# Patient Record
Sex: FEMALE | Race: WHITE | NOT HISPANIC OR LATINO | Employment: STUDENT | URBAN - METROPOLITAN AREA
[De-identification: names, ages, dates, MRNs, and addresses within clinical notes are randomized per-mention and may not be internally consistent; named-entity substitution may affect disease eponyms.]

---
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
## 2024-11-08 ENCOUNTER — TELEPHONE (OUTPATIENT)
Dept: FAMILY MEDICINE | Facility: CLINIC | Age: 19
End: 2024-11-08

## 2024-11-27 ENCOUNTER — OFFICE VISIT (OUTPATIENT)
Dept: FAMILY MEDICINE | Facility: CLINIC | Age: 19
End: 2024-11-27
Payer: COMMERCIAL

## 2024-11-27 ENCOUNTER — HOSPITAL ENCOUNTER (OUTPATIENT)
Dept: RADIOLOGY | Facility: HOSPITAL | Age: 19
Discharge: HOME | End: 2024-11-27
Attending: OTOLARYNGOLOGY
Payer: COMMERCIAL

## 2024-11-27 ENCOUNTER — DOCUMENTATION ONLY (OUTPATIENT)
Dept: FAMILY MEDICINE | Facility: CLINIC | Age: 19
End: 2024-11-27

## 2024-11-27 VITALS
HEART RATE: 70 BPM | TEMPERATURE: 98 F | BODY MASS INDEX: 21.25 KG/M2 | HEIGHT: 64 IN | WEIGHT: 124.5 LBS | OXYGEN SATURATION: 99 % | SYSTOLIC BLOOD PRESSURE: 106 MMHG | DIASTOLIC BLOOD PRESSURE: 64 MMHG | RESPIRATION RATE: 18 BRPM

## 2024-11-27 DIAGNOSIS — Z01.818 PREOP EXAMINATION: ICD-10-CM

## 2024-11-27 DIAGNOSIS — Z01.818 OTHER SPECIFIED PRE-OPERATIVE EXAMINATION: ICD-10-CM

## 2024-11-27 DIAGNOSIS — J34.89 OVERDEVELOPMENT OF NASAL BONES: ICD-10-CM

## 2024-11-27 DIAGNOSIS — Z00.00 ENCOUNTER FOR WELLNESS EXAMINATION: Primary | ICD-10-CM

## 2024-11-27 DIAGNOSIS — Z13.220 ENCOUNTER FOR LIPID SCREENING FOR CARDIOVASCULAR DISEASE: ICD-10-CM

## 2024-11-27 DIAGNOSIS — Z13.6 ENCOUNTER FOR LIPID SCREENING FOR CARDIOVASCULAR DISEASE: ICD-10-CM

## 2024-11-27 DIAGNOSIS — Z79.01 LONG TERM (CURRENT) USE OF ANTICOAGULANTS: ICD-10-CM

## 2024-11-27 DIAGNOSIS — J34.2 DEVIATED NASAL SEPTUM: ICD-10-CM

## 2024-11-27 DIAGNOSIS — F90.0 ATTENTION DEFICIT HYPERACTIVITY DISORDER (ADHD), PREDOMINANTLY INATTENTIVE TYPE: ICD-10-CM

## 2024-11-27 DIAGNOSIS — J34.89 OVERDEVELOPMENT OF NASAL BONES: Primary | ICD-10-CM

## 2024-11-27 LAB
CHOLEST SERPL-MSCNC: 149 MG/DL (ref 0–200)
HDLC SERPL-MCNC: 115 MG/DL (ref 35–70)
LDLC SERPL CALC-MCNC: 23 MG/DL (ref 0–160)
TRIGL SERPL-MCNC: 79 MG/DL (ref 40–160)

## 2024-11-27 PROCEDURE — 71046 X-RAY EXAM CHEST 2 VIEWS: CPT | Mod: TC

## 2024-11-27 RX ORDER — METHYLPHENIDATE HYDROCHLORIDE 54 MG/1
TABLET ORAL
COMMUNITY
Start: 2024-09-10

## 2024-11-27 RX ORDER — ZALEPLON 5 MG/1
1 CAPSULE ORAL NIGHTLY
COMMUNITY

## 2024-11-27 RX ORDER — ESCITALOPRAM OXALATE 10 MG/1
1 TABLET ORAL DAILY
COMMUNITY
Start: 2024-01-11

## 2024-11-27 NOTE — PROGRESS NOTES
Patient ID: [id number]     Chief Complaint: Establish Care (Establishing Care/Previous PCP- Pedi)        HPI:     Steph Parada is a 19 y.o. female here today for an annual wellness visit. Overall she feels well. No other complaints today. Denies depression, si/hi, melena, hematochezia. She does have menorrhagia with irregular cycles.     She is here for a preop exam as well. Will be having a septoplasty with Dr. Moody Edwards. Has no PMH outside of anxiety and adhd. Sees psych for this.     Preop exam  Active CP-NO  Heart Failure-NO  Valvular Disease-NO  History of CVA-NO  Serum Creatinine >(2.0)-NO    ?Can take care of self, such as eat, dress, or use the toilet (1 MET)-Yes  ?Can walk up a flight of steps or a hill or walk on level ground at 3 to 4 mph (4 METs)-Yes          No past medical history on file.     Past Surgical History:   Procedure Laterality Date    RHINOPLASTY      TONSILLECTOMY      WISDOM TOOTH EXTRACTION         Review of patient's allergies indicates:  No Known Allergies    Outpatient Medications Marked as Taking for the 11/27/24 encounter (Office Visit) with Emily Rascon MD   Medication Sig Dispense Refill    EScitalopram oxalate (LEXAPRO) 10 MG tablet Take 1 tablet by mouth once daily.      methylphenidate HCl (CONCERTA) 54 MG CR tablet       zaleplon (SONATA) 5 MG Cap Take 1 capsule by mouth every evening.         Social History     Socioeconomic History    Marital status: Single   Tobacco Use    Smoking status: Never    Smokeless tobacco: Never   Substance and Sexual Activity    Alcohol use: Yes     Alcohol/week: 2.0 standard drinks of alcohol     Types: 2 Glasses of wine per week    Drug use: Never    Sexual activity: Not Currently     Birth control/protection: None     Social Drivers of Health     Financial Resource Strain: Low Risk  (11/27/2024)    Overall Financial Resource Strain (CARDIA)     Difficulty of Paying Living Expenses: Not hard at all   Food Insecurity: No  Food Insecurity (11/27/2024)    Hunger Vital Sign     Worried About Running Out of Food in the Last Year: Never true     Ran Out of Food in the Last Year: Never true   Physical Activity: Sufficiently Active (11/27/2024)    Exercise Vital Sign     Days of Exercise per Week: 7 days     Minutes of Exercise per Session: 90 min   Stress: Stress Concern Present (11/27/2024)    [employer] - Occupational Stress Questionnaire     Feeling of Stress : Very much   Housing Stability: Unknown (11/27/2024)    Housing Stability Vital Sign     Unable to Pay for Housing in the Last Year: No        Family History   Problem Relation Name Age of Onset    Arthritis Mother Why     Alcohol abuse Paternal Aunt Why         Patient Care Team:  Emily Rascon MD as PCP - General (Family Medicine)       Subjective:     ROS    Constitutional: Denies Change in appetite. Denies Chills. Denies Fever. Denies Night sweats.  Eye: Denies changes in vision  ENT: Denies Decreased hearing. Denies Sore throat. Denies Swollen glands.  Respiratory: Denies Cough. Denies Shortness of breath. Denies Shortness of breath with exertion. Denies Wheezing.  Cardiovascular: DeniesChest pain at rest. Denies Chest pain with exertion. Denies Irregular heartbeat. Denies Palpitations. Denies Edema.  Gastrointestinal: Denies Abdominal pain. DeniesDiarrhea. Denies Nausea. Denies Vomiting. Denies Hematemesis or Hematochezia.  Genitourinary: Denies Dysuria. Denies Urinary frequency. Denies Urinary urgency. Denies Blood in urine.  Endocrine: Denies Cold intolerance. Denies Excessive thirst. Denies Heat intolerance. Denies Weight loss. Denies Weight gain.  Musculoskeletal: Denies Painful joints. Denies Weakness.  Integumentary: Denies Rash. Denies Itching. Denies Dry skin.  Neurologic: Denies Dizziness. Denies Fainting. Denies Headache.  Psychiatric: Denies Depression. Denies Anxiety. Denies Suicidal/Homicidal ideations.    All Other ROS:  "Negative except as stated in HPI.       Objective:     /64 (BP Location: Right arm, Patient Position: Sitting)   Pulse 70   Temp 98.1 °F (36.7 °C) (Temporal)   Resp 18   Ht 5' 3.78" (1.62 m)   Wt 56.5 kg (124 lb 8 oz)   LMP 11/22/2024   SpO2 99%   BMI 21.52 kg/m²     Physical Exam    General: Alert and oriented, No acute distress.  Head: Normocephalic, Atraumatic.  Eye: Pupils are equal, round and reactive to light, Extraocular movements are intact, Sclera non-icteric.  Ears/Nose/Throat: TM+ light reflexes bilaterally. Normal, Mucosa moist,Clear. Teeth intact, no lesions of tongue, palate, mucosa.  Respiratory: Clear to auscultation bilaterally; No wheezes, rales or rhonchi, Non-labored respirations, Symmetrical chest wall expansion.  Cardiovascular: Regular rate and rhythm, S1/S2 normal, No murmurs, rubs or gallops.  Gastrointestinal: Soft, Non-tender, Non-distended, Normal bowel sounds, No palpable organomegaly.  Integumentary: Warm, Dry, Intact, No suspicious lesions or rashes.  Extremities: No clubbing, cyanosis or edema  Psychiatric: Normal interaction, Coherent speech, Euthymic mood, Appropriate affect       Assessment:     Problem List Items Addressed This Visit       Attention deficit hyperactivity disorder (ADHD), predominantly inattentive type    Overview     On concerta. Managed by psych in [place], [place]    Continue current Rx meds            Other Visit Diagnoses       Encounter for wellness examination    -  Primary    Relevant Orders    Comprehensive Metabolic Panel    TSH    T4, Free    Lipid Panel    Preop examination        Relevant Orders    Comprehensive Metabolic Panel    TSH    T4, Free    Encounter for lipid screening for cardiovascular disease        Relevant Orders    Lipid Panel            Plan:     Will check cbc, cmp, tsh, free t4 and lipids  Patient is low risk for low risk surgery    Health Maintenance Topics with due status: Not Due       Topic Last Completion Date    " TETANUS VACCINE 07/22/2015    RSV Vaccine (Age 60+ and Pregnant patients) Not Due        Eye Exam - Recommend annually.    Dental Exam - Recommend biannual exams.     Vaccinations -   There is no immunization history on file for this patient.   Patient was counseled on risks/benefits of receiving immunization. All questions were answered    Perform monthly self breast exams and call me immediately if you find a lump/bump or have any skin/nipple changes  Exercise for a total of 150 min per week and eat a healthy diet  Stay up to date with all cancer screening discussed in visit  Immunizations due were discussed during visit  All health maintenance was reviewed with patient. Patient verbalized understanding. All questions were answered.     Medication List with Changes/Refills   Current Medications    ESCITALOPRAM OXALATE (LEXAPRO) 10 MG TABLET    Take 1 tablet by mouth once daily.       Start Date: 1/11/2024 End Date: --    METHYLPHENIDATE HCL (CONCERTA) 54 MG CR TABLET           Start Date: 9/10/2024 End Date: --    ZALEPLON (SONATA) 5 MG CAP    Take 1 capsule by mouth every evening.       Start Date: --        End Date: --   Discontinued Medications    ONDANSETRON (ZOFRAN-ODT) 4 MG TBDL    Take 1 tablet (4 mg total) by mouth every 8 (eight) hours as needed (nausea).       Start Date: 11/14/2022End Date: 11/27/2024    PROMETHAZINE-DEXTROMETHORPHAN (PROMETHAZINE-DM) 6.25-15 MG/5 ML SYRP    Take 5 mLs by mouth every 6 (six) hours as needed. May cause drowsiness       Start Date: 11/14/2022End Date: 11/27/2024          The patient's Health Maintenance was reviewed and the following appears to be due at this time:   Health Maintenance Due   Topic Date Due    Hepatitis C Screening  Never done    Lipid Panel  Never done    HIV Screening  Never done    Influenza Vaccine (1) 09/01/2024    COVID-19 Vaccine (1 - 2024-25 season) Never done         No follow-ups on file. In addition to their scheduled follow up, the patient has  also been instructed to follow up on as needed basis.

## 2024-12-17 ENCOUNTER — ANESTHESIA EVENT (OUTPATIENT)
Facility: HOSPITAL | Age: 19
End: 2024-12-17
Payer: COMMERCIAL

## 2024-12-17 RX ORDER — LIDOCAINE HYDROCHLORIDE 10 MG/ML
0.5 INJECTION, SOLUTION EPIDURAL; INFILTRATION; INTRACAUDAL; PERINEURAL ONCE
Status: CANCELLED | OUTPATIENT
Start: 2024-12-17 | End: 2024-12-17

## 2024-12-17 RX ORDER — CELECOXIB 200 MG/1
400 CAPSULE ORAL ONCE
Status: CANCELLED | OUTPATIENT
Start: 2024-12-17 | End: 2024-12-17

## 2024-12-17 NOTE — ANESTHESIA PREPROCEDURE EVALUATION
"                                                                                                             12/17/2024  Steph Parada is a 19 y.o., female.    Pre-operative evaluation for Procedure(s) (LRB):  RHINOPLASTY    SEPTOPLASTY, NOSE (N/A)    Steph Parada is a 19 y.o. female     Patient Active Problem List   Diagnosis    Attention deficit hyperactivity disorder (ADHD), predominantly inattentive type       Review of patient's allergies indicates:  No Known Allergies    No current facility-administered medications on file prior to encounter.     No current outpatient medications on file prior to encounter.       Past Surgical History:   Procedure Laterality Date    ADENOIDECTOMY      RHINOPLASTY      TONSILLECTOMY      WISDOM TOOTH EXTRACTION       CBC: No results for input(s): "WBC", "RBC", "HGB", "HCT", "PLT", "MCV", "MCH", "MCHC" in the last 72 hours.    CMP: No results for input(s): "NA", "K", "CL", "CO2", "BUN", "CREATININE", "GLU", "MG", "PHOS", "CALCIUM", "ALBUMIN", "PROT", "ALKPHOS", "ALT", "AST", "BILITOT" in the last 72 hours.  Diagnostic Studies:  CXR 11/27/2024: [facility]    EKG :  2D Echo :  No results found for this or any previous visit.  Pre-op Assessment    I have reviewed the Patient Summary Reports.     I have reviewed the Nursing Notes. I have reviewed the NPO Status.   I have reviewed the Medications.     Review of Systems  Anesthesia Hx:  No problems with previous Anesthesia   History of prior surgery of interest to airway management or planning:  Previous anesthesia: General TA:; RHinoplasty with general anesthesia.       Airway issues documented on chart review include GETA     Denies Family Hx of Anesthesia complications.    Denies Personal Hx of Anesthesia complications.                    Social:  Social Alcohol Use 2 drinks/week      Hematology/Oncology:  Hematology Normal   Oncology Normal                                   EENT/Dental:  EENT/Dental Normal         "   Cardiovascular:  Cardiovascular Normal                                              Pulmonary:  Pulmonary Normal                       Renal/:  Renal/ Normal                 Hepatic/GI:  Hepatic/GI Normal                    Musculoskeletal:     Mild scoliosis       Spine Disorders:   Scoliosis          Neurological:  Neurology Normal                                      Endocrine:  Endocrine Normal            Dermatological:  Skin Normal    Psych:  Psychiatric History   ADHD               Physical Exam  General: Well nourished, Cooperative, Alert and Oriented    Airway:  Mallampati: II / I  Mouth Opening: Normal  TM Distance: Normal  Tongue: Normal  Neck ROM: Normal ROM    Dental:  Intact  Px denies any loose teeth.  Chest/Lungs:  Clear to auscultation, Normal Respiratory Rate    Heart:  Rate: Normal  Rhythm: Regular Rhythm    Abdomen:  Normal, Soft        Anesthesia Plan  Type of Anesthesia, risks & benefits discussed:    Anesthesia Type: Gen ETT  Intra-op Monitoring Plan: Standard ASA Monitors  Induction:  IV  Informed Consent: Informed consent signed with the Patient and all parties understand the risks and agree with anesthesia plan.  All questions answered.   ASA Score: 2  Day of Surgery Review of History & Physical: H&P Update referred to the surgeon/provider.I have interviewed and examined the patient. I have reviewed the patient's H&P dated:   Anesthesia Plan Notes: TIVA with mask/NC, GA back-up.     Ready For Surgery From Anesthesia Perspective.     .

## 2024-12-18 ENCOUNTER — ANESTHESIA (OUTPATIENT)
Facility: HOSPITAL | Age: 19
End: 2024-12-18
Payer: COMMERCIAL

## 2024-12-18 ENCOUNTER — HOSPITAL ENCOUNTER (OUTPATIENT)
Facility: HOSPITAL | Age: 19
Discharge: HOME | End: 2024-12-18
Attending: OTOLARYNGOLOGY | Admitting: OTOLARYNGOLOGY
Payer: COMMERCIAL

## 2024-12-18 DIAGNOSIS — J34.2 DEVIATED SEPTUM: ICD-10-CM

## 2024-12-18 PROCEDURE — 71000015 HC POSTOP RECOV 1ST HR: Performed by: OTOLARYNGOLOGY

## 2024-12-18 PROCEDURE — 36000706: Performed by: OTOLARYNGOLOGY

## 2024-12-18 PROCEDURE — 25000003 PHARM REV CODE 250: Performed by: OTOLARYNGOLOGY

## 2024-12-18 PROCEDURE — 63600175 PHARM REV CODE 636 W HCPCS: Performed by: NURSE ANESTHETIST, CERTIFIED REGISTERED

## 2024-12-18 PROCEDURE — 25000003 PHARM REV CODE 250: Performed by: ANESTHESIOLOGY

## 2024-12-18 PROCEDURE — 63600175 PHARM REV CODE 636 W HCPCS: Performed by: OTOLARYNGOLOGY

## 2024-12-18 PROCEDURE — 37000008 HC ANESTHESIA 1ST 15 MINUTES: Performed by: OTOLARYNGOLOGY

## 2024-12-18 PROCEDURE — 71000016 HC POSTOP RECOV ADDL HR: Performed by: OTOLARYNGOLOGY

## 2024-12-18 PROCEDURE — 25000003 PHARM REV CODE 250: Performed by: NURSE ANESTHETIST, CERTIFIED REGISTERED

## 2024-12-18 PROCEDURE — 71000033 HC RECOVERY, INTIAL HOUR: Performed by: OTOLARYNGOLOGY

## 2024-12-18 PROCEDURE — 63600175 PHARM REV CODE 636 W HCPCS: Performed by: ANESTHESIOLOGY

## 2024-12-18 PROCEDURE — 63600175 PHARM REV CODE 636 W HCPCS: Performed by: STUDENT IN AN ORGANIZED HEALTH CARE EDUCATION/TRAINING PROGRAM

## 2024-12-18 PROCEDURE — 63600175 PHARM REV CODE 636 W HCPCS

## 2024-12-18 PROCEDURE — 36000707: Performed by: OTOLARYNGOLOGY

## 2024-12-18 PROCEDURE — C9046 COCAINE HCL NASAL SOLUTION: HCPCS | Performed by: OTOLARYNGOLOGY

## 2024-12-18 PROCEDURE — 37000009 HC ANESTHESIA EA ADD 15 MINS: Performed by: OTOLARYNGOLOGY

## 2024-12-18 RX ORDER — FAMOTIDINE 20 MG/1
20 TABLET, FILM COATED ORAL ONCE
Status: DISCONTINUED | OUTPATIENT
Start: 2024-12-18 | End: 2024-12-18

## 2024-12-18 RX ORDER — GENTAMICIN 40 MG/ML
INJECTION, SOLUTION INTRAMUSCULAR; INTRAVENOUS
Status: DISCONTINUED | OUTPATIENT
Start: 2024-12-18 | End: 2024-12-18 | Stop reason: HOSPADM

## 2024-12-18 RX ORDER — FAMOTIDINE 10 MG/ML
20 INJECTION INTRAVENOUS ONCE
Status: COMPLETED | OUTPATIENT
Start: 2024-12-19 | End: 2024-12-18

## 2024-12-18 RX ORDER — PROPOFOL 10 MG/ML
VIAL (ML) INTRAVENOUS
Status: DISCONTINUED | OUTPATIENT
Start: 2024-12-18 | End: 2024-12-18

## 2024-12-18 RX ORDER — FENTANYL CITRATE 50 UG/ML
INJECTION, SOLUTION INTRAMUSCULAR; INTRAVENOUS
Status: DISCONTINUED | OUTPATIENT
Start: 2024-12-18 | End: 2024-12-18

## 2024-12-18 RX ORDER — HYDROMORPHONE HYDROCHLORIDE 2 MG/ML
0.4 INJECTION, SOLUTION INTRAMUSCULAR; INTRAVENOUS; SUBCUTANEOUS EVERY 10 MIN PRN
Status: DISCONTINUED | OUTPATIENT
Start: 2024-12-18 | End: 2024-12-18 | Stop reason: HOSPADM

## 2024-12-18 RX ORDER — HYDROMORPHONE HYDROCHLORIDE 2 MG/ML
INJECTION, SOLUTION INTRAMUSCULAR; INTRAVENOUS; SUBCUTANEOUS
Status: DISCONTINUED
Start: 2024-12-18 | End: 2024-12-18 | Stop reason: HOSPADM

## 2024-12-18 RX ORDER — TRANEXAMIC ACID 100 MG/ML
INJECTION, SOLUTION INTRAVENOUS
Status: DISCONTINUED | OUTPATIENT
Start: 2024-12-18 | End: 2024-12-18 | Stop reason: HOSPADM

## 2024-12-18 RX ORDER — ONDANSETRON HYDROCHLORIDE 2 MG/ML
4 INJECTION, SOLUTION INTRAVENOUS ONCE
Status: DISCONTINUED | OUTPATIENT
Start: 2024-12-18 | End: 2024-12-18 | Stop reason: HOSPADM

## 2024-12-18 RX ORDER — MUPIROCIN 20 MG/G
OINTMENT TOPICAL
Status: DISCONTINUED | OUTPATIENT
Start: 2024-12-18 | End: 2024-12-18 | Stop reason: HOSPADM

## 2024-12-18 RX ORDER — ACETAMINOPHEN 325 MG/1
TABLET ORAL
Status: DISCONTINUED
Start: 2024-12-18 | End: 2024-12-18 | Stop reason: HOSPADM

## 2024-12-18 RX ORDER — LIDOCAINE HYDROCHLORIDE 10 MG/ML
INJECTION, SOLUTION EPIDURAL; INFILTRATION; INTRACAUDAL; PERINEURAL
Status: DISCONTINUED | OUTPATIENT
Start: 2024-12-18 | End: 2024-12-18

## 2024-12-18 RX ORDER — EPINEPHRINE 1 MG/ML
INJECTION INTRAMUSCULAR; INTRAVENOUS; SUBCUTANEOUS
Status: DISCONTINUED | OUTPATIENT
Start: 2024-12-18 | End: 2024-12-18 | Stop reason: HOSPADM

## 2024-12-18 RX ORDER — GLUCAGON 1 MG
1 KIT INJECTION
Status: DISCONTINUED | OUTPATIENT
Start: 2024-12-18 | End: 2024-12-18 | Stop reason: HOSPADM

## 2024-12-18 RX ORDER — CEFAZOLIN 2 G/1
2 INJECTION, POWDER, FOR SOLUTION INTRAMUSCULAR; INTRAVENOUS ONCE
Status: DISCONTINUED | OUTPATIENT
Start: 2024-12-18 | End: 2024-12-18 | Stop reason: HOSPADM

## 2024-12-18 RX ORDER — PROMETHAZINE HYDROCHLORIDE 25 MG/ML
6.25 INJECTION, SOLUTION INTRAMUSCULAR; INTRAVENOUS ONCE
Status: COMPLETED | OUTPATIENT
Start: 2024-12-18 | End: 2024-12-18

## 2024-12-18 RX ORDER — CEFAZOLIN SODIUM 1 G/3ML
INJECTION, POWDER, FOR SOLUTION INTRAMUSCULAR; INTRAVENOUS
Status: DISCONTINUED | OUTPATIENT
Start: 2024-12-18 | End: 2024-12-18

## 2024-12-18 RX ORDER — DEXAMETHASONE SODIUM PHOSPHATE 4 MG/ML
INJECTION, SOLUTION INTRA-ARTICULAR; INTRALESIONAL; INTRAMUSCULAR; INTRAVENOUS; SOFT TISSUE
Status: DISCONTINUED | OUTPATIENT
Start: 2024-12-18 | End: 2024-12-18

## 2024-12-18 RX ORDER — MEPERIDINE HYDROCHLORIDE 25 MG/ML
INJECTION INTRAMUSCULAR; INTRAVENOUS; SUBCUTANEOUS
Status: COMPLETED
Start: 2024-12-18 | End: 2024-12-18

## 2024-12-18 RX ORDER — ROCURONIUM BROMIDE 10 MG/ML
INJECTION, SOLUTION INTRAVENOUS
Status: DISCONTINUED | OUTPATIENT
Start: 2024-12-18 | End: 2024-12-18

## 2024-12-18 RX ORDER — MIDAZOLAM HYDROCHLORIDE 2 MG/2ML
INJECTION, SOLUTION INTRAMUSCULAR; INTRAVENOUS
Status: COMPLETED
Start: 2024-12-18 | End: 2024-12-18

## 2024-12-18 RX ORDER — LIDOCAINE HYDROCHLORIDE AND EPINEPHRINE 10; 10 UG/ML; MG/ML
INJECTION, SOLUTION INFILTRATION; PERINEURAL
Status: DISCONTINUED | OUTPATIENT
Start: 2024-12-18 | End: 2024-12-18 | Stop reason: HOSPADM

## 2024-12-18 RX ORDER — ACETAMINOPHEN 325 MG/1
650 TABLET ORAL
Status: COMPLETED | OUTPATIENT
Start: 2024-12-18 | End: 2024-12-18

## 2024-12-18 RX ORDER — MEPERIDINE HYDROCHLORIDE 25 MG/ML
12.5 INJECTION INTRAMUSCULAR; INTRAVENOUS; SUBCUTANEOUS EVERY 10 MIN PRN
Status: DISCONTINUED | OUTPATIENT
Start: 2024-12-18 | End: 2024-12-18 | Stop reason: HOSPADM

## 2024-12-18 RX ORDER — PROMETHAZINE HYDROCHLORIDE 25 MG/ML
INJECTION, SOLUTION INTRAMUSCULAR; INTRAVENOUS
Status: COMPLETED
Start: 2024-12-18 | End: 2024-12-18

## 2024-12-18 RX ORDER — HYDROXYZINE 50 MG/ML
INJECTION, SOLUTION INTRAMUSCULAR
Status: COMPLETED
Start: 2024-12-18 | End: 2024-12-18

## 2024-12-18 RX ORDER — COCAINE HYDROCHLORIDE 40 MG/ML
SOLUTION NASAL
Status: DISCONTINUED | OUTPATIENT
Start: 2024-12-18 | End: 2024-12-18 | Stop reason: HOSPADM

## 2024-12-18 RX ORDER — SODIUM CHLORIDE, SODIUM LACTATE, POTASSIUM CHLORIDE, CALCIUM CHLORIDE 600; 310; 30; 20 MG/100ML; MG/100ML; MG/100ML; MG/100ML
INJECTION, SOLUTION INTRAVENOUS CONTINUOUS
Status: DISCONTINUED | OUTPATIENT
Start: 2024-12-18 | End: 2024-12-18 | Stop reason: HOSPADM

## 2024-12-18 RX ORDER — CEFAZOLIN 2 G/1
INJECTION, POWDER, FOR SOLUTION INTRAMUSCULAR; INTRAVENOUS
Status: COMPLETED
Start: 2024-12-18 | End: 2024-12-18

## 2024-12-18 RX ORDER — MIDAZOLAM HYDROCHLORIDE 1 MG/ML
2 INJECTION INTRAMUSCULAR; INTRAVENOUS ONCE AS NEEDED
Status: COMPLETED | OUTPATIENT
Start: 2024-12-18 | End: 2024-12-18

## 2024-12-18 RX ORDER — FAMOTIDINE 20 MG/50ML
20 INJECTION, SOLUTION INTRAVENOUS ONCE
Status: DISCONTINUED | OUTPATIENT
Start: 2024-12-19 | End: 2024-12-18 | Stop reason: CLARIF

## 2024-12-18 RX ORDER — SODIUM CHLORIDE, SODIUM LACTATE, POTASSIUM CHLORIDE, CALCIUM CHLORIDE 600; 310; 30; 20 MG/100ML; MG/100ML; MG/100ML; MG/100ML
INJECTION, SOLUTION INTRAVENOUS CONTINUOUS
Status: ACTIVE | OUTPATIENT
Start: 2024-12-18 | End: 2024-12-18

## 2024-12-18 RX ORDER — ONDANSETRON HYDROCHLORIDE 2 MG/ML
4 INJECTION, SOLUTION INTRAVENOUS ONCE AS NEEDED
Status: COMPLETED | OUTPATIENT
Start: 2024-12-18 | End: 2024-12-18

## 2024-12-18 RX ORDER — MEPERIDINE HYDROCHLORIDE 25 MG/ML
INJECTION INTRAMUSCULAR; INTRAVENOUS; SUBCUTANEOUS
Status: DISCONTINUED
Start: 2024-12-18 | End: 2024-12-18 | Stop reason: HOSPADM

## 2024-12-18 RX ADMIN — DEXMEDETOMIDINE HYDROCHLORIDE 4 MCG: 400 INJECTION INTRAVENOUS at 09:12

## 2024-12-18 RX ADMIN — DEXMEDETOMIDINE HYDROCHLORIDE 4 MCG: 400 INJECTION INTRAVENOUS at 08:12

## 2024-12-18 RX ADMIN — HYDROMORPHONE HYDROCHLORIDE 0.2 MG: 2 INJECTION INTRAMUSCULAR; INTRAVENOUS; SUBCUTANEOUS at 10:12

## 2024-12-18 RX ADMIN — DEXMEDETOMIDINE HYDROCHLORIDE 2 MCG: 400 INJECTION INTRAVENOUS at 09:12

## 2024-12-18 RX ADMIN — FAMOTIDINE 20 MG: 10 INJECTION, SOLUTION INTRAVENOUS at 08:12

## 2024-12-18 RX ADMIN — HYDROMORPHONE HYDROCHLORIDE 0.4 MG: 2 INJECTION INTRAMUSCULAR; INTRAVENOUS; SUBCUTANEOUS at 11:12

## 2024-12-18 RX ADMIN — DEXAMETHASONE SODIUM PHOSPHATE 12 MG: 4 INJECTION, SOLUTION INTRA-ARTICULAR; INTRALESIONAL; INTRAMUSCULAR; INTRAVENOUS; SOFT TISSUE at 08:12

## 2024-12-18 RX ADMIN — PROMETHAZINE HYDROCHLORIDE 6.25 MG: 25 INJECTION INTRAMUSCULAR; INTRAVENOUS at 11:12

## 2024-12-18 RX ADMIN — SUGAMMADEX 100 MG: 100 INJECTION, SOLUTION INTRAVENOUS at 10:12

## 2024-12-18 RX ADMIN — MEPERIDINE HYDROCHLORIDE 25 MG: 25 INJECTION INTRAMUSCULAR; INTRAVENOUS; SUBCUTANEOUS at 11:12

## 2024-12-18 RX ADMIN — ONDANSETRON HYDROCHLORIDE 4 MG: 2 SOLUTION INTRAMUSCULAR; INTRAVENOUS at 10:12

## 2024-12-18 RX ADMIN — MIDAZOLAM HYDROCHLORIDE 2 MG: 1 INJECTION, SOLUTION INTRAMUSCULAR; INTRAVENOUS at 08:12

## 2024-12-18 RX ADMIN — SODIUM CHLORIDE: 9 INJECTION, SOLUTION INTRAVENOUS at 08:12

## 2024-12-18 RX ADMIN — FENTANYL CITRATE 50 MCG: 50 INJECTION, SOLUTION INTRAMUSCULAR; INTRAVENOUS at 08:12

## 2024-12-18 RX ADMIN — DEXMEDETOMIDINE HYDROCHLORIDE 2 MCG: 400 INJECTION INTRAVENOUS at 10:12

## 2024-12-18 RX ADMIN — MIDAZOLAM HYDROCHLORIDE 2 MG: 1 INJECTION INTRAMUSCULAR; INTRAVENOUS at 08:12

## 2024-12-18 RX ADMIN — ONDANSETRON HYDROCHLORIDE 4 MG: 2 SOLUTION INTRAMUSCULAR; INTRAVENOUS at 08:12

## 2024-12-18 RX ADMIN — CEFAZOLIN 2 G: 330 INJECTION, POWDER, FOR SOLUTION INTRAMUSCULAR; INTRAVENOUS at 08:12

## 2024-12-18 RX ADMIN — SODIUM CHLORIDE, POTASSIUM CHLORIDE, SODIUM LACTATE AND CALCIUM CHLORIDE: 600; 310; 30; 20 INJECTION, SOLUTION INTRAVENOUS at 09:12

## 2024-12-18 RX ADMIN — HYDROXYZINE HYDROCHLORIDE 25 MG: 50 INJECTION, SOLUTION INTRAMUSCULAR at 11:12

## 2024-12-18 RX ADMIN — TRANEXAMIC ACID 1000 MG: 100 INJECTION, SOLUTION INTRAVENOUS at 08:12

## 2024-12-18 RX ADMIN — ACETAMINOPHEN 650 MG: 325 TABLET ORAL at 08:12

## 2024-12-18 RX ADMIN — HYDROMORPHONE HYDROCHLORIDE 0.2 MG: 2 INJECTION INTRAMUSCULAR; INTRAVENOUS; SUBCUTANEOUS at 09:12

## 2024-12-18 RX ADMIN — ROCURONIUM BROMIDE 10 MG: 10 INJECTION, SOLUTION INTRAVENOUS at 09:12

## 2024-12-18 RX ADMIN — PROPOFOL 200 MG: 10 INJECTION, EMULSION INTRAVENOUS at 08:12

## 2024-12-18 RX ADMIN — LIDOCAINE HYDROCHLORIDE 50 MG: 10 INJECTION, SOLUTION EPIDURAL; INFILTRATION; INTRACAUDAL; PERINEURAL at 08:12

## 2024-12-18 RX ADMIN — DEXMEDETOMIDINE HYDROCHLORIDE 6 MCG: 400 INJECTION INTRAVENOUS at 08:12

## 2024-12-18 RX ADMIN — ROCURONIUM BROMIDE 50 MG: 10 INJECTION, SOLUTION INTRAVENOUS at 08:12

## 2024-12-18 NOTE — ANESTHESIA PROCEDURE NOTES
Intubation    Date/Time: 12/18/2024 8:25 AM    Performed by: Kathe Harrell CRNA  Authorized by: Sirena Garcia MD    Intubation:     Induction:  Intravenous    Intubated:  Postinduction    Mask Ventilation:  Easy mask    Attempts:  1    Attempted By:  CRNA    Method of Intubation:  Direct    Blade:  Rodriguez 2    Laryngeal View Grade: Grade IIA - cords partially seen      Difficult Airway Encountered?: No      Complications:  None    Airway Device:  Oral endotracheal tube    Airway Device Size:  6.5    Style/Cuff Inflation:  Cuffed (inflated to minimal occlusive pressure)    Inflation Amount (mL):  4    Tube secured:  22    Secured at:  The lips    Placement Verified By:  Capnometry    Complicating Factors:  None    Findings Post-Intubation:  BS equal bilateral and atraumatic/condition of teeth unchanged

## 2024-12-18 NOTE — OP NOTE
[facility]                         [address] [address]    PATIENT NAME:      JANA HERRERA   YOB: 2005  CSN:               [id number]  MRN:               [id number]  ADMIT DATE:        12/18/2024 06:58:00  PHYSICIAN:         Moody Edwards                          OPERATIVE REPORT      DATE OF SURGERY:        SURGEON:  Moody Edwards    PREOPERATIVE DIAGNOSES:  Nasal obstruction, nasal deformity following nasal   fracture.    POSTOPERATIVE DIAGNOSES:  Nasal obstruction, nasal deformity following nasal   fracture.    PROCEDURE PERFORMED:  Septorhinoplasty.    ASSISTANT:  Daniella Bell RN    PROCEDURE IN DETAIL:  The patient was brought to the operating room, placed in   supine position, achieving general endotracheal anesthesia.  A throat pack was   placed in the oropharynx while the eyes were protected with tape.   Then 4%   cocaine and topical adrenaline were used to decongest the nose.  After   sufficient time had elapsed for decongestion, 1% lidocaine with 1:100,000   epinephrine was injected into the soft tissues of the nose.  The patient was   prepped and draped for surgery.  After allowing for vasoconstriction, an 11   blade was used to make an inverted-V, mid columellar incision with a 15 blade,   marginal incisions were made bilaterally.  The nasal skin and soft tissue   envelope was elevated in avascular plane.  The periosteum of the nasal bone was   scored and elevated.   Alar cartilages were  in midline and junction   tunnels were made.  Bilateral mucoperichondrial flaps were elevated noting the   septum to be deviated over to the right.  We then turned our attention to the   dorsum which was lowered with a rasp over the bony dorsum as well as a broken 11   blade where necessary.  Then, deviated portion of quadrilateral cartilage and   maxillary  crest was removed with care taken to leave appropriate dorsal and   caudal struts.  The alar lower lateral cartilages were identified and resected   down to 7 mm at the widest with the lateral crura.  A dome equalization suture   was carried out as well as single dome units at this time.  We then performed   medial fading osteotomies as well as lateral osteotomies with a silver osteotome   by making a stab incision anterior to the anterior insertion of the inferior   turbinate in high-low-high fashion, nose was fractured.  Bilateral    grafts were placed in the upper lateral cartilages and septum to support the   middle 3rd of the nose.  The medial crura were then reapproximated with 5-0 PDS   suture over a columellar strut.  The columellar incision was closed with 5-0   fast-absorbing gut sutures. The marginal incision was closed 4-0 chromic   sutures.  Bilateral [place] excisions were done, taking slightly more from the   right than left to aid in symmetry.  A small amount of Bactroban-soaked Nu Gauze   was used to pack the nose.  Standard Gelfoam, Steri-Strip, and Aquaplast   dressing were placed.  The throat pack was removed.  Pharynx was carefully   suctioned.  The eyes were washed with BSS and cold compresses were applied.  The   patient tolerated the procedure well, was awakened and extubated in the   operating room, brought to recovery in stable condition.    DISCHARGE SUMMARY:  Steph is status post septorhinoplasty and doing well.  We   will discharge her home and will follow in the office in 5 days on standard   postoperative medications and instructions.        ______________________________  Moody Edwards    JSIMON/AQS  DD:  12/18/2024  Time:  10:40AM  DT:  12/18/2024  Time:  11:19AM  Job #:  [id number]      OPERATIVE REPORT

## 2024-12-18 NOTE — TRANSFER OF CARE
"Anesthesia Transfer of Care Note    Patient: Steph Parada    Procedure(s) Performed: Procedure(s) (LRB):  RHINOPLASTY   ////CASH (N/A)  SEPTOPLASTY, NOSE (N/A)    Patient location: PACU    Anesthesia Type: general    Transport from OR: Transported from OR on room air with adequate spontaneous ventilation    Post pain: adequate analgesia    Post assessment: no apparent anesthetic complications and tolerated procedure well    Post vital signs: stable    Level of consciousness: responds to stimulation    Nausea/Vomiting: no nausea/vomiting    Complications: none    Transfer of care protocol was followed      Last vitals: Visit Vitals  /79   Pulse 61   Temp 36.8 °C (98.2 °F) (Oral)   Ht 5' 4" (1.626 m)   Wt 56.2 kg (124 lb)   SpO2 100%   Breastfeeding No   BMI 21.28 kg/m²     "

## 2024-12-18 NOTE — ANESTHESIA POSTPROCEDURE EVALUATION
Anesthesia Post Evaluation    Patient: Steph Parada    Procedure(s) Performed: Procedure(s) (LRB):  RHINOPLASTY   (N/A)  SEPTOPLASTY, NOSE (N/A)    Final Anesthesia Type: general      Patient location during evaluation: PACU  Patient participation: Yes- Able to Participate  Level of consciousness: awake and alert, awake and oriented  Post-procedure vital signs: reviewed and stable  Pain management: adequate  Airway patency: patent    PONV status at discharge: No PONV  Anesthetic complications: no      Cardiovascular status: blood pressure returned to baseline, hemodynamically stable and stable  Respiratory status: unassisted, spontaneous ventilation and room air  Hydration status: euvolemic  Follow-up not needed.              Vitals Value Taken Time   /59 12/18/24 1229   Temp 36.7 °C (98.1 °F) 12/18/24 1055   Pulse 156 12/18/24 1241   Resp 17 12/18/24 1240   SpO2 78% 12/18/24 1241   Vitals shown include unfiled device data.  Vitals:    12/18/24 1225 12/18/24 1230 12/18/24 1235 12/18/24 1240   BP: (!) 121/59      Pulse: 83  76 81   Resp: 16 17 17 17   Temp:       TempSrc:       SpO2: (!) 94%  98% 96%   Weight:       Height:            Event Time   Out of Recovery 11:50:00         Pain/Brenda Score: Pain Rating Prior to Med Admin: 6 (12/18/2024 11:47 AM)  Brenda Score: 7 (12/18/2024 11:02 AM)

## 2024-12-18 NOTE — DISCHARGE INSTRUCTIONS
Rhinoplasty, Care After    Refer to this sheet in the next few weeks. These instructions provide you with information on caring for yourself after your procedure. Your health care provider may also give you more specific instructions. Your treatment has been planned according to current medical practices, but problems sometimes occur. Call your health care provider if you have any problems or questions after your procedure.    WHAT TO EXPECT AFTER THE PROCEDURE    After your procedure, it is typical to have the following:    Swelling of the nose and face. Swelling and bruising around the eyes may increase for the first 2-3 days. It usually goes away in a couple weeks.    Headaches and facial pain. You will be given pain medicine to control this.    Minor bleeding from the nose the first couple days after surgery.    Nasal congestion. Mouth breathing, snoring, and bad breath.    Nasal discharge for 1-2 days after surgery.    HOME CARE INSTRUCTIONS    Do not blow your nose for 6 weeks after surgery. Sneeze through your mouth.    Change nasal drip dressing as often as you need. May keep off if nose not bleeding.    Do not get nasal splint wet.    Keep your head raised (elevated) on a couple pillows. Sleep with your head elevated.    Apply iced gauze pads to your eyes for the next 48 hours.    Avoid strenuous activities for 2-3 weeks. Get plenty of rest and do not get overheated. No heavy lifting/bending over. No driving for 1 week. Protect your nose until healing is complete. No work/school until cleared by doctor.    After cast is removed, then you will clean the incisions under nostrils with Q-tip and Peroxide and apply Bactroban/Polysporin ointment 3 times per day.    Begin Nasamist after nasal splint is removed. This will help relieve your congestion.    Do not wear eye glasses/sunglasses for 6 weeks after surgery. If you wear glasses, tape them to your forehead until your nose is completely healed.    Avoid  unprotected sun exposure and tanning beds for 3 months.    Only take over-the-counter or prescription medicines as directed by your health care provider.    Be patient with the healing process. Day by day your nose will look better. In a couple weeks, cosmetics can be used to cover up the signs of the surgery. Healing takes time, and the final results may not be known for up to a year.    SEEK MEDICAL CARE IF:    You have a fever.    You lose your nasal packing before the time your health care provider says it is to be removed.    You have a pus-like discharge from your nose.    SEEK IMMEDIATE MEDICAL CARE IF:    You have increased pain or swelling in your face or around your eyes after the first 2-3 days.    You have increased bleeding from your nose.    Your headache or facial pain is not relieved with pain medicine.    You have chest pain or shortness of breath    MEDICATIONS:    *First dose of Hydrocodone/acetaminophen (Norco), for pain, available at _4 pm__ . Take medication with food.     May alternate with Tylenol.  Can have tylenol at _12 pm_______.    *First dose of Ondansetron (Zofran), for nausea, available at __ anytime _ .    *Resume Cephalexin  (antibiotic) the day after surgery.    *Take first dose of Arnica vitamin tonight before bedtime.      Resume all other vitamins tomorrow.    *Avoid aspirin, NSAIDs, and blood thinners until approved by surgeon.    *Avoid nasal sprays and antibiotic ointment until permitted by your surgeon.    *Iced gauze to eyes for 48 hours.    *Do not get cast wet or remove it from nose.    *Stool softeners are recommended  to help prevent constipation

## 2024-12-19 VITALS
HEIGHT: 64 IN | DIASTOLIC BLOOD PRESSURE: 59 MMHG | HEART RATE: 81 BPM | RESPIRATION RATE: 17 BRPM | TEMPERATURE: 98 F | SYSTOLIC BLOOD PRESSURE: 121 MMHG | WEIGHT: 124 LBS | BODY MASS INDEX: 21.17 KG/M2 | OXYGEN SATURATION: 96 %

## (undated) DEVICE — GAUZE CURAD STRIP .25IN 5YD

## (undated) DEVICE — GAUZE BANDAGE CONFORM 2X75IN

## (undated) DEVICE — CORD BIPOLAR 12 FOOT

## (undated) DEVICE — ADHESIVE MASTISOL VIAL 48/BX

## (undated) DEVICE — NDL REG BEVEL 27GX1/2IN

## (undated) DEVICE — STRIP MEDI WND CLSR 1/4X4IN

## (undated) DEVICE — STRIP PKNG STRL 1/2INX5YD

## (undated) DEVICE — SUPPORT ULNA NERVE PROTECTOR

## (undated) DEVICE — BLADE SURG STAINLESS STEEL #11

## (undated) DEVICE — BLADE TONGUE DEPRESSOR STRL

## (undated) DEVICE — GLOVE 7.5 PROTEXIS PI MICRO

## (undated) DEVICE — SUT PDS PLUS 4-0 P-3 18IN

## (undated) DEVICE — SUT 5/0 18IN PROLENE BL MO

## (undated) DEVICE — BLANKET WARMING LOWER BODY

## (undated) DEVICE — POSITIONER HEAD ADULT

## (undated) DEVICE — SUT PLN GUT 4-0 SC-1SC-1 1

## (undated) DEVICE — BASIN EMESIS 700 ML

## (undated) DEVICE — TIP YANKAUERS BULB NO VENT

## (undated) DEVICE — CONTAINER SPECIMEN SCREW 4OZ

## (undated) DEVICE — Device

## (undated) DEVICE — SUT 5/0 18IN PLAIN FAST AB

## (undated) DEVICE — GAUZE DRAIN N WVN 6PLY 4X4IN

## (undated) DEVICE — GLOVE SIGNATURE MICRO LTX 6.5

## (undated) DEVICE — CLOSURE SKIN STERI STRIP 1/2X4

## (undated) DEVICE — MARKER FN REG DUAL UTIL RULER

## (undated) DEVICE — SUT 5/0 18IN PDS II CLR MO